# Patient Record
Sex: FEMALE | Race: WHITE
[De-identification: names, ages, dates, MRNs, and addresses within clinical notes are randomized per-mention and may not be internally consistent; named-entity substitution may affect disease eponyms.]

---

## 2017-04-11 ENCOUNTER — HOSPITAL ENCOUNTER (OUTPATIENT)
Dept: HOSPITAL 45 - C.LAB1850 | Age: 82
Discharge: HOME | End: 2017-04-11
Attending: INTERNAL MEDICINE
Payer: COMMERCIAL

## 2017-04-11 DIAGNOSIS — E55.9: ICD-10-CM

## 2017-04-11 DIAGNOSIS — I10: Primary | ICD-10-CM

## 2017-04-11 LAB
ALBUMIN/GLOB SERPL: 1.3 {RATIO} (ref 0.9–2)
ALP SERPL-CCNC: 72 U/L (ref 45–117)
ALT SERPL-CCNC: 26 U/L (ref 12–78)
ANION GAP SERPL CALC-SCNC: 8 MMOL/L (ref 3–11)
AST SERPL-CCNC: 18 U/L (ref 15–37)
BUN SERPL-MCNC: 29 MG/DL (ref 7–18)
BUN/CREAT SERPL: 35.2 (ref 10–20)
CALCIUM SERPL-MCNC: 9.6 MG/DL (ref 8.5–10.1)
CHLORIDE SERPL-SCNC: 107 MMOL/L (ref 98–107)
CO2 SERPL-SCNC: 29 MMOL/L (ref 21–32)
CREAT SERPL-MCNC: 0.82 MG/DL (ref 0.6–1.2)
GLOBULIN SER-MCNC: 2.9 GM/DL (ref 2.5–4)
GLUCOSE SERPL-MCNC: 112 MG/DL (ref 70–99)
POTASSIUM SERPL-SCNC: 3.7 MMOL/L (ref 3.5–5.1)
SODIUM SERPL-SCNC: 144 MMOL/L (ref 136–145)

## 2017-05-18 ENCOUNTER — HOSPITAL ENCOUNTER (OUTPATIENT)
Dept: HOSPITAL 45 - C.MAMM | Age: 82
Discharge: HOME | End: 2017-05-18
Attending: OBSTETRICS & GYNECOLOGY
Payer: COMMERCIAL

## 2017-05-18 DIAGNOSIS — Z12.31: Primary | ICD-10-CM

## 2017-05-18 DIAGNOSIS — Z90.12: ICD-10-CM

## 2017-05-18 NOTE — MAMMOGRAPHY REPORT
UNILATERAL RIGHT DIGITAL SCREENING MAMMOGRAM TOMOSYNTHESIS WITH CAD: 5/18/2017



TECHNIQUE:  Breast tomosynthesis in addition to standard 2D mammography was performed. Current study
 was also evaluated with a Computer Aided Detection (CAD) system.  Right CC and MLO 2-D and tomosynt
hesis images were obtained.  



COMPARISON: Comparison is made to exams dated:  5/12/2016 mammogram, 5/7/2015 mammogram, 5/5/2014 ma
mmogram, 5/1/2013 stereotactic biopsy, 4/22/2013 mammogram, and 4/16/2013 mammogram - [facility].   



BREAST COMPOSITION:  The tissue of the right breast is heterogeneously dense, which may obscure smal
l masses.  



FINDINGS:  There are no suspicious masses, calcifications, or areas of architectural distortion note
d in the right breast.  There has been no significant interval change compared to prior exams.  A bi
opsy marker clip is again noted in the right 12:00 breast.  Scattered benign-appearing calcification
s are not significantly changed.  Asymmetry in the far superior right breast on the MLO view is stab
le dating back to the 2008 exam.



IMPRESSION:  ACR BI-RADS CATEGORY 2: BENIGN

There is no mammographic evidence of malignancy. A 1 year screening mammogram is recommended.  The p
atient will receive written notification of the results.  





Approximately 10% of breast cancers are not detected with mammography. A negative mammographic repor
t should not delay biopsy if a clinically suggestive mass is present.



April Vargas M.D.          

ah/:5/18/2017 14:47:36  



Imaging Technologist: Erika KENT(TIFFANY)(M), [facility]

letter sent: Normal 1/2  

BI-RADS Code: ACR BI-RADS Category 2: Benign

## 2017-10-12 ENCOUNTER — HOSPITAL ENCOUNTER (OUTPATIENT)
Dept: HOSPITAL 45 - C.LABBC | Age: 82
Discharge: HOME | End: 2017-10-12
Attending: INTERNAL MEDICINE
Payer: COMMERCIAL

## 2017-10-12 DIAGNOSIS — E55.9: ICD-10-CM

## 2017-10-12 DIAGNOSIS — I10: Primary | ICD-10-CM

## 2017-10-12 LAB
ALBUMIN/GLOB SERPL: 1.1 {RATIO} (ref 0.9–2)
ALP SERPL-CCNC: 65 U/L (ref 45–117)
ALT SERPL-CCNC: 28 U/L (ref 12–78)
ANION GAP SERPL CALC-SCNC: 5 MMOL/L (ref 3–11)
AST SERPL-CCNC: 19 U/L (ref 15–37)
BUN SERPL-MCNC: 23 MG/DL (ref 7–18)
BUN/CREAT SERPL: 32.5 (ref 10–20)
CALCIUM SERPL-MCNC: 9.4 MG/DL (ref 8.5–10.1)
CHLORIDE SERPL-SCNC: 106 MMOL/L (ref 98–107)
CHOLEST/HDLC SERPL: 2.3 {RATIO}
CO2 SERPL-SCNC: 29 MMOL/L (ref 21–32)
CREAT SERPL-MCNC: 0.71 MG/DL (ref 0.6–1.2)
GLOBULIN SER-MCNC: 3.3 GM/DL (ref 2.5–4)
GLUCOSE SERPL-MCNC: 92 MG/DL (ref 70–99)
GLUCOSE UR QL: 91 MG/DL
KETONES UR QL STRIP: 106 MG/DL
NITRITE UR QL STRIP: 79 MG/DL (ref 0–150)
PH UR: 213 MG/DL (ref 0–200)
POTASSIUM SERPL-SCNC: 3.8 MMOL/L (ref 3.5–5.1)
SODIUM SERPL-SCNC: 140 MMOL/L (ref 136–145)
TSH SERPL-ACNC: 0.68 UIU/ML (ref 0.3–4.5)
VERY LOW DENSITY LIPOPROT CALC: 16 MG/DL

## 2018-05-21 ENCOUNTER — HOSPITAL ENCOUNTER (OUTPATIENT)
Dept: HOSPITAL 45 - C.MAMM | Age: 83
Discharge: HOME | End: 2018-05-21
Attending: OBSTETRICS & GYNECOLOGY
Payer: COMMERCIAL

## 2018-05-21 DIAGNOSIS — Z90.12: ICD-10-CM

## 2018-05-21 DIAGNOSIS — Z12.31: Primary | ICD-10-CM

## 2018-05-21 DIAGNOSIS — Z85.3: ICD-10-CM

## 2018-05-21 NOTE — MAMMOGRAPHY REPORT
UNILATERAL RIGHT DIGITAL SCREENING MAMMOGRAM TOMOSYNTHESIS WITH CAD: 5/21/2018

CLINICAL HISTORY: Asymptomatic. Personal history of breast cancer.  





TECHNIQUE:  Right breast tomosynthesis in addition to standard 2D mammography was performed. Current 
study was also evaluated with a Computer Aided Detection (CAD) system.  



COMPARISON: Comparison is made to exams dated:  5/18/2017 mammogram, 5/12/2016 mammogram, 5/7/2015 ma
mmogram, 5/5/2014 mammogram, 4/22/2013 mammogram, and 4/16/2013 mammogram - [facility].   



BREAST COMPOSITION:  The tissue of the right breast is heterogeneously dense, which may obscure small
 masses.  



FINDINGS: There is stable asymmetry and adjacent grouped calcifications in the far superior right susi
ast on the MLO view.  Scattered benign coarse calcifications and rim calcifications in the right viki
st.  A stable osmany-shaped biopsy marker clip in the 12:00 right breast.  Mild to moderate vascular evans
cification.  No suspicious mass, architectural distortion or cluster of microcalcifications is seen. 
 



IMPRESSION:  ACR BI-RADS CATEGORY 1: NEGATIVE

There is no mammographic evidence of malignancy. A 1 year screening mammogram is recommended.  The pa
tient will receive written notification of the results.  





Approximately 10% of breast cancers are not detected with mammography. A negative mammographic report
 should not delay biopsy if a clinically suggestive mass is present.



Pam Beckwith M.D.          

ay/:5/21/2018 12:52:34  



Imaging Technologist: Claudia KENT(TIFFANY)(KAREN)(BD), [facility]

letter sent: Normal 1/2  

BI-RADS Code: ACR BI-RADS Category 1: Negative